# Patient Record
Sex: FEMALE | Race: WHITE
[De-identification: names, ages, dates, MRNs, and addresses within clinical notes are randomized per-mention and may not be internally consistent; named-entity substitution may affect disease eponyms.]

---

## 2021-02-06 ENCOUNTER — HOSPITAL ENCOUNTER (EMERGENCY)
Dept: HOSPITAL 61 - ER | Age: 36
Discharge: HOME | End: 2021-02-06
Payer: SELF-PAY

## 2021-02-06 VITALS — BODY MASS INDEX: 34.18 KG/M2 | WEIGHT: 192.9 LBS | HEIGHT: 63 IN

## 2021-02-06 VITALS — DIASTOLIC BLOOD PRESSURE: 77 MMHG | SYSTOLIC BLOOD PRESSURE: 127 MMHG

## 2021-02-06 DIAGNOSIS — J03.90: Primary | ICD-10-CM

## 2021-02-06 PROCEDURE — 87880 STREP A ASSAY W/OPTIC: CPT

## 2021-02-06 PROCEDURE — 99283 EMERGENCY DEPT VISIT LOW MDM: CPT

## 2021-02-06 NOTE — PHYS DOC
Past Medical History


Past Medical History:  No Pertinent History


Past Surgical History:  No Surgical History


Smoking Status:  Never Smoker


Alcohol Use:  None





General Adult


EDM:


Chief Complaint:  SORE THROAT





HPI:


HPI:





Patient is a 35  year old female who presents with sore throat, enlarged 

tonsils, pus pockets, symptoms began 3 days ago.  Patient denies any fever.





Review of Systems:


Review of Systems:


Constitutional:   Denies fever or chills. []


Eyes:   Denies change in visual acuity. []


HENT:   Reports sore throat, enlarged tonsils, pus pockets, denies nasal 

congestion


Respiratory:   Denies cough or shortness of breath. [] 


Cardiovascular:   Denies chest pain or edema. [] 


GI:   Denies abdominal pain, nausea, vomiting, bloody stools or diarrhea. [] 


:  Denies dysuria. [] 


Musculoskeletal:   Denies back pain or joint pain. [] 


Integument:   Denies rash. [] 


Neurologic:   Denies headache, focal weakness or sensory changes. [] 


Psychiatric:  Denies depression or anxiety. []





Heart Score:


Risk Factors:


Risk Factors:  DM, Current or recent (<one month) smoker, HTN, HLP, family 

history of CAD, obesity.


Risk Scores:


Score 0 - 3:  2.5% MACE over next 6 weeks - Discharge Home


Score 4 - 6:  20.3% MACE over next 6 weeks - Admit for Clinical Observation


Score 7 - 10:  72.7% MACE over next 6 weeks - Early Invasive Strategies





Physical Exam:


PE:





Constitutional: Well developed, well nourished, no acute distress, non-toxic 

appearance. []


HENT: Normocephalic, atraumatic, bilateral external ears normal, oropharynx 

moist, no oral exudates, nose normal. []


Midline uvula


+2 tonsils with exudate bilaterally and erythema


+2 bilateral anterior cervical adenopathy


Eyes: PERRLA, EOMI, conjunctiva normal, no discharge. [] 


Neck: Normal range of motion, no tenderness, supple, no stridor. [] 


Cardiovascular:Heart rate regular rhythm, no murmur []


Lungs & Thorax:  Bilateral breath sounds clear to auscultation []


Abdomen: Bowel sounds normal, soft, no tenderness, no masses, no pulsatile 

masses. [] 


Skin: Warm, dry, no erythema, no rash. [] 


Back: No tenderness, no CVA tenderness. [] 


Extremities: No tenderness, no cyanosis, no clubbing, ROM intact, no edema. [] 


Neurologic: Alert and oriented X 3, normal motor function, normal sensory 

function, no focal deficits noted. []


Psychologic: Affect normal, judgement normal, mood normal. []





Current Patient Data:


Vital Signs:





                                   Vital Signs








  Date Time  Temp Pulse Resp B/P (MAP) Pulse Ox O2 Delivery O2 Flow Rate FiO2


 


2/6/21 11:00 98.3 117 16 127/77 (94) 100 Room Air  





 98.3       











EKG:


EKG:


[]





Radiology/Procedures:


Radiology/Procedures:


[]





Course & Med Decision Making:


Course & Med Decision Making


Pertinent Labs and Imaging studies reviewed. (See chart for details)





This is a 35-year-old female patient with physical exam of tonsillitis, d

ischarged on amoxicillin and prednisone.  Tylenol/Motrin for pain or fever.  

Salt water gargles recommended.  Return precautions provided as well as follow-

up information





Dragon Disclaimer:


Dragon Disclaimer:


This electronic medical record was generated, in whole or in part, using a voice

 recognition dictation system.





Departure


Departure


Impression:  


   Primary Impression:  


   Acute tonsillitis


   Qualified Codes:  J03.90 - Acute tonsillitis, unspecified


Disposition:  01 DC HOME SELF CARE/HOMELESS


Condition:  STABLE


Referrals:  


WILLIAM CURTIS MD


follow up in 1-2 weeks


Patient Instructions:  Tonsillitis





Additional Instructions:  


You have acute tonsillitis.  Take the prescribed antibiotics until completed.  

Take Tylenol Motrin for pain or fever.  Follow-up with your primary care doctor 

in 1 to 2 weeks


Scripts


Prednisone (PREDNISONE) 50 Mg Tablet


1 TAB PO DAILY, #5 TAB


   Prov: NJ BARROSO APRN         2/6/21 


Amoxicillin (AMOXICILLIN) 875 Mg Tablet


1 TAB PO BID, #20 TAB


   Prov: MAGYUNGANJ APRN         2/6/21











NJ BARROSO               Feb 6, 2021 11:31

## 2022-02-04 ENCOUNTER — HOSPITAL ENCOUNTER (EMERGENCY)
Dept: HOSPITAL 61 - ER | Age: 37
Discharge: HOME | End: 2022-02-04
Payer: SELF-PAY

## 2022-02-04 VITALS — DIASTOLIC BLOOD PRESSURE: 61 MMHG | SYSTOLIC BLOOD PRESSURE: 126 MMHG

## 2022-02-04 VITALS — BODY MASS INDEX: 35.43 KG/M2 | WEIGHT: 199.96 LBS | HEIGHT: 63 IN

## 2022-02-04 DIAGNOSIS — Z53.21: ICD-10-CM

## 2022-02-04 DIAGNOSIS — J02.9: Primary | ICD-10-CM

## 2022-02-04 PROCEDURE — 87880 STREP A ASSAY W/OPTIC: CPT

## 2022-02-04 PROCEDURE — 87070 CULTURE OTHR SPECIMN AEROBIC: CPT
